# Patient Record
Sex: FEMALE | URBAN - NONMETROPOLITAN AREA
[De-identification: names, ages, dates, MRNs, and addresses within clinical notes are randomized per-mention and may not be internally consistent; named-entity substitution may affect disease eponyms.]

---

## 2019-04-29 ENCOUNTER — IMPORTED ENCOUNTER (OUTPATIENT)
Dept: URBAN - NONMETROPOLITAN AREA CLINIC 1 | Facility: CLINIC | Age: 44
End: 2019-04-29

## 2019-04-29 PROCEDURE — 92310 CONTACT LENS FITTING OU: CPT

## 2019-04-29 PROCEDURE — S0621 ROUTINE OPHTHALMOLOGICAL EXA: HCPCS

## 2021-08-27 ENCOUNTER — IMPORTED ENCOUNTER (OUTPATIENT)
Dept: URBAN - NONMETROPOLITAN AREA CLINIC 1 | Facility: CLINIC | Age: 46
End: 2021-08-27

## 2021-08-27 PROBLEM — E11.9: Noted: 2021-08-27

## 2021-08-27 PROCEDURE — S0621 ROUTINE OPHTHALMOLOGICAL EXA: HCPCS

## 2021-08-27 PROCEDURE — 92310 CONTACT LENS FITTING OU: CPT

## 2021-08-27 NOTE — PATIENT DISCUSSION
Astigmatism-Discussed diagnosis with patient. Updated spec Rx given. Recommend lens that will provide comfort as well as protect safety and health of eyes. CL wear-CLs fit and center well.-Stressed that patient should not sleep in CL. -Updated CL Rx given. -CL care and precautions given. DM s -Stressed the importance of keeping blood sugars under control blood pressure under control and weight normalization and regular visits with PCP. -Explained the possible effects of poorly controlled diabetes and the damage that diabetes can cause to ocular health. -Patient to check HgbA1C.-Pt instructed to contact our office with any vision changes.; Dr's Notes: pcp Guille Tapia

## 2022-04-09 ASSESSMENT — VISUAL ACUITY
OS_SC: 20/20
OS_SC: 20/30
OS_CC: J1
OD_SC: 20/30
OD_SC: 20/20
OD_CC: J1

## 2022-04-09 ASSESSMENT — TONOMETRY
OS_IOP_MMHG: 15
OD_IOP_MMHG: 15

## 2023-04-04 ENCOUNTER — ESTABLISHED PATIENT (OUTPATIENT)
Dept: RURAL CLINIC 1 | Facility: CLINIC | Age: 48
End: 2023-04-04

## 2023-04-04 DIAGNOSIS — E11.9: ICD-10-CM

## 2023-04-04 DIAGNOSIS — H52.223: ICD-10-CM

## 2023-04-04 PROCEDURE — 92310-E CONTACT LENS FITTING ESTABLISH PATIENT

## 2023-04-04 PROCEDURE — 99214 OFFICE O/P EST MOD 30 MIN: CPT

## 2023-04-04 ASSESSMENT — TONOMETRY
OS_IOP_MMHG: 14
OD_IOP_MMHG: 14

## 2023-04-04 ASSESSMENT — VISUAL ACUITY
OD_CC: 20/40
OU_CC: 20/40
OS_CC: 20/20
OU_CC: 20/20
OS_CC: 20/40
OD_CC: 20/20

## 2023-05-17 RX ORDER — OXYCODONE HYDROCHLORIDE AND ACETAMINOPHEN 5; 325 MG/1; MG/1
1-2 TABLET ORAL EVERY 4 HOURS PRN
COMMUNITY
Start: 2016-11-05

## 2023-05-17 RX ORDER — GLYBURIDE 5 MG/1
5 TABLET ORAL
COMMUNITY

## 2023-05-17 RX ORDER — IBUPROFEN 600 MG/1
600 TABLET ORAL EVERY 6 HOURS PRN
COMMUNITY
Start: 2016-11-05

## 2023-05-17 RX ORDER — FOLIC ACID 1 MG/1
TABLET ORAL 3 TIMES DAILY
COMMUNITY